# Patient Record
(demographics unavailable — no encounter records)

---

## 2025-02-06 NOTE — ASSESSMENT
[FreeTextEntry1] : 67 YO F w/hx of multiple severe comorbidities, including severe COPD requiring ATC home O2, hx of DM, CHF, CVA, NSTEMI, CAD, s/p stents, on plavix, morbid obesity, ARMAND.  Pt with incidental finding of complex pancreatic cyst (3 cm range) in 2018, which has progressed over the years currently in the 6+ cm range and associated gradual increase of pancreatic diameter of 6-9 mm now 2.4-2.7 cm.    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  These lesions have been increasing in size while under surveillance over the past 6+ years.  As detailed in prior notes, given severe medical comorbidities, pt is not a candidate for EUS given sedation risk and not a candidate for surgery.    I have discussed the findings in detail with the patient.   She understands the nature of IPMN, that this has potential for malignant transformation.  Based on imaging findings, her lesions would be considered high risk for malignant transformation but she is not a candidate for EUS/FNA or surgical resection.  There are no overt findings of active malignancy (i.e. no nodules or mass lesions).  She would like to continue radiographic surveillance.   We will plan for repeat MRCP in one year (January 2026).  2) Abnormal colon finding Prior imaging reviewed with Dr. Mckenna (colon surgeon) at [facility]. Unclear what the tubular bibiana-colonic structure is as pt is s/p appendectomy. It is not noted on current MRI. no focal symtpoms. no further evaluation at this time.  Time spent: 45 minutes. This includes time spent prior to the encounter (review of office visit notes, imaging studies, endoscopy and pathology reports, and all outside medical records), in direct face-to-face encounter with the patient (obtaining history, performing a medically appropriate examination, counseling and educating of the patient, family or caregiver, ordering medications, tests or procedures, referring and communicating with health care professionals) and following the encounter  (documenting clinical information in patients electronic record, and communicating results to the referring physician).

## 2025-02-06 NOTE — REASON FOR VISIT
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

## 2025-02-06 NOTE — HISTORY OF PRESENT ILLNESS
[Home] : at home, [unfilled] , at the time of the visit. [Medical Office: ([facility])___] : at the medical office located in  [Verbal consent obtained from patient] : the patient, [unfilled] [FreeTextEntry1] :   Pt well known to me from [facility].  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  Incidental pancreatic cyst dating back to 2018. (Details in my prior outside progress notes).  Initially, cyst in 3 cm range in 2018, with pancreatic duct diameter approx 6 mm. Cyst grew in size to 4 cm range in 2020 to 5 cm range in 2023 and now 6 cm range. Pancreatic duct diameter increased in size to over 20 mm diameter by 2023.  Given severe comorbidities, pt was considered too high an anesthesia risk for EUS. She had formal surgical evaluation at [facility] and again deemed too high a surgical risk. Decision was made to monitor pancreas radiographically. Initially we were scanning her every 6 months and more recently on an annual basis.  Her last MRCP was in Dec 2023.  Cyst in 5 cm range and PD at 2.7 cm. CT scan June '24 showed similar findings, no solid mass. For unknown reasons, she underwent a CT Abd in Oct 2024, which was read as findings "concerning for malignancy". This appeared to be due to fact that this was done at a different facility then where all of her other scans were performed, and presumably the radiologist did not have access to prior films. We recommended a repeat MRCP for better comparison.  Pt presents today to establish care with [facility].  She states that her COPD has progressed to "Stage 4". But overall, she is feeling well. Denies icterus, jaundice, abd pain, or change in bowels. No GI bleeding. She has had puroseful weight loss, losing over 50 lbs over the past 1-2 years.  MRCP (Jan 2025):  6.6 cm pancreatic head cyst and 2.4 cm PD; finding essentially stable compared to Dec 2023 MRCP.